# Patient Record
Sex: FEMALE | Race: ASIAN | NOT HISPANIC OR LATINO | Employment: STUDENT | URBAN - METROPOLITAN AREA
[De-identification: names, ages, dates, MRNs, and addresses within clinical notes are randomized per-mention and may not be internally consistent; named-entity substitution may affect disease eponyms.]

---

## 2019-10-30 ENCOUNTER — OFFICE VISIT (OUTPATIENT)
Dept: ORTHOPEDICS | Facility: CLINIC | Age: 24
End: 2019-10-30
Payer: COMMERCIAL

## 2019-10-30 VITALS — WEIGHT: 142 LBS | HEIGHT: 65 IN | BODY MASS INDEX: 23.66 KG/M2

## 2019-10-30 DIAGNOSIS — M25.561 ACUTE PAIN OF RIGHT KNEE: Primary | ICD-10-CM

## 2019-10-30 RX ORDER — MEDROXYPROGESTERONE ACETATE 150 MG/ML
150 INJECTION, SUSPENSION INTRAMUSCULAR
COMMUNITY
Start: 2019-10-04 | End: 2022-12-12

## 2019-10-30 ASSESSMENT — MIFFLIN-ST. JEOR: SCORE: 1399.99

## 2019-10-30 NOTE — LETTER
10/30/2019       RE: Bea Ramirez  [address] [facility] [address]     Dear Colleague,    Thank you for referring your patient, Bea Ramirez, to the [facility] SPORTS AND ORTHOP[facility] WALK [facility] CLINIC at [facility]. Please see a copy of my visit note below.          SPORTS & ORTHOPEDIC WALK-IN VISIT 10/30/2019    Primary Care Physician:      Started 2 weeks ago Monday, playing volleyball. No trauma, just noticed pain. Following day pain with full flexion and full extension. Didn't feel the same walking around. After that played volleyball again and didn't get better, has gotten a little worse. Has been resting a bit. Feels she has to change positions a lot if sitting or laying down for too long. Achy pain, feels it deep posterior when extending, deep anterior when flexing.   History of knee issues when she was younger but went away eventually.     Reason for visit:     What part of your body is injured / painful?  right knee    What caused the injury /pain? Unsure    How long ago did your injury occur or pain begin? several weeks ago    What are your most bothersome symptoms? Pain, weakness     How would you characterize your symptom?  aching    What makes your symptoms better? Rest    What makes your symptoms worse? Other: sitting in one position for too long, full flexion or extension      Have you been previously seen for this problem? No    Medical History:    Any recent changes to your medical history? No    Any new medication prescribed since last visit? No    Have you had surgery on this body part before? No    Social History:    Occupation: student     Handedness:     Exercise: 3-5 days/week    Review of Systems:    Do you have fever, chills, weight loss? No    Do you have any vision problems? No    Do you have any chest pain or edema? No    Do you have any shortness of breath or wheezing?  No    Do you have stomach problems? No    Do you have any  "numbness or focal weakness? Yes, with sitting sometimes in R leg    Do you have diabetes? No    Do you have problems with bleeding or clotting? No    Do you have an rashes or other skin lesions? No           [facility]  Orthopedics  Vel Julio DO  10/30/2019     Name: Bea Ramirez  MRN: [id number]  Age: 24 year old  : 1995  Referring provider: Referred Self     Chief Complaint: Pain of the Right Knee    Date of Injury: 10/14/19    History of Present Illness:   Bea Ramirez is a 24 year old, female with a history of knee injury who presents today for evaluation of right knee pain. She reports that two weeks ago she was playing volleyball and onset pain with full extension and flexion with no inciting injury or trauma. She notes that she has tried to play volleyball since, and notes that her condition had worsened. Has difficulty walking secondary to the pain. Pain is onset with full flexion deep in anterior aspect and with full extension deep in posterior. Her pain is exacerbated with movement. No mechanical symptoms. No further concerns at this time.     Review of Systems:   A 10-point review of systems was obtained and is negative except for as noted in the HPI.     Medications:     Current Outpatient Medications:      medroxyPROGESTERone (DEPO-PROVERA) 150 MG/ML IM injection, Inject 150 mg into the muscle, Disp: , Rfl:     Allergies:  Patient has no known allergies.     Past Medical History:  No past medical history on file.    Past Surgical History:  No past surgical history on file.     Social History:  She denies tobacco use and denies alcohol use.     Family History:  No family history on file.    Physical Examination:  Height 1.651 m (5' 5\"), weight 64.4 kg (142 lb).  General  - normal appearance, in no obvious distress  HEENT  -Pupils equal, round, no conjunctival injection.  No lid lag  CV  - normal popliteal pulse  Pulm  - normal respiratory pattern, non-labored  Musculoskeletal " - right knee  - stance: normal gait without limp, normal squat without pain  - inspection: no swelling or effusion, normal bone and joint alignment, no obvious deformity  - palpation: no joint line tenderness, patella and patellar tendon non-tender.   - ROM: 135 degrees flexion, -5 degrees extension, not painful, normal actively and passively compared to contralateral  - strength: 5/5 in flexion, 5/5 in extension  - special tests:  (-) Lachman  (-) anterior drawer  (-) posterior drawer  (-) pivot shift  (-) Brandi  (-) Thessaly  (-) varus at 0 and 30 degrees flexion  (-) valgus at 0 and 30 degrees flexion  (-) Yoav s compression test  (-) patellar apprehension  Neuro  - no sensory or motor deficit, grossly normal coordination, normal muscle tone  Skin  - no ecchymosis, erythema, warmth, or induration, no obvious rash  Psych  - interactive, appropriate, normal mood and affect    Imaging:   Deferred     Assessment:   24 year old, female with acute right knee pain suffered during a volleyball game two weeks ago. Examination today rather benign without worrisome findings to suggest any major meniscal injury or ligamentous tear. However given historical elements may have had a mild sprain of knee vs. Small meniscal injury.    Plan:   9. Stop playing volleyball to rest  10. Refrain from heavy weight lower body exercises.   11. Recommended Aleve (2 in the morning and 2 at night) to reduce inflammation x 1 week  12. Provided home exercise program for stretching and conditioning of the knee. Meniscal program specific.  13. Follow up in two weeks if symptoms do not improve x 3 weeks.    IVel DO, have reviewed the above note and agree with the scribe's notation as written.    Scribe Disclosure:  IHasmukh, am serving as a scribe to document services personally performed by Vel Julio DO at this visit, based upon the provider's statements to me. All documentation has been reviewed by the aforementioned  provider prior to being entered into the official medical record.       Again, thank you for allowing me to participate in the care of your patient.      Sincerely,    Vel Julio, DO

## 2019-10-30 NOTE — PATIENT INSTRUCTIONS
BARGER FACTS ABOUT MENISCAL TEARS  Click to enlarge A meniscal tear can be caused by a sudden activity that twists or tears a ligament, such as a forced twisting motion of your knee, or a fall or impact during football, basketball, or soccer.    Straightening your knee further than it can normally be straightened (hyperextension) or trouble bending, like when you land from a jump, is also another cause of a meniscal tear.  A loud, painful pop at the time of the injury is very common. It causes an immediate swelling and pain around the knee as if loose or unstable.    WHAT IS MENISCAL TEAR?  A meniscal tear is tear to the meniscal ligament that keeps the knee from bending inward. The meniscal, along with other ligaments, keeps your knee and leg bones in place when you walk or run. When a ligament is injured, it can be stretched, partially torn, or completely torn. Complete tears make the knee joint very loose and unstable.    A ligament injury is also called a sprain.    HOW IS MENISCAL TEAR DIAGNOSED AND TREATED?  A physician will examine the knee, ligaments, and tissue to make an initial assessment. Typically, he or she will recommend a combination of X-rays, CT scans, or MRIs to determine the exact extent of the damage.     You will need to change or stop doing the activities that cause pain until the ligament has healed.      If you have swelling in your joint, your healthcare team may need to remove fluid from your knee with a needle and syringe.    Your care team may wrap an elastic bandage around your knee to keep the swelling from getting worse. You may need to keep your knee in a knee immobilizer and use crutches to protect your knee while you heal.    For complete tears, you and your healthcare team will decide if you should have intense rehabilitation therapy or if you should have surgery followed by rehab. A torn meniscal cannot be sewn back together. The ligament must be surgically reconstructed by taking  ligaments or tendons from another part of your leg and connecting them to the thighbone and shinbone.    If you have a completely torn mensical and it is not repaired with surgery, the effects will be life-long. Your knee may feel loose and feel like it will give way when you are running and making quick turns. Rehabilitation exercises and a special brace will help improve these symptoms. If you can do your normal activities without pain and are willing to give up activities that put extra stress on your knee, you may not need surgery.    You may consider having reconstructive meniscal tear surgery if:    Your knee is unstable and gives out during routine or athletic activity.    You are a  and your knee could be unstable and give out during your sport (for example, basketball, football, or soccer).    You are not willing to give up sports that involve pivoting and cutting, like soccer and basketball.    You want to prevent further injury to your knee. An unstable knee may lead to more injuries and arthritis.    HOW CAN I MANAGE MENISCAL TEAR?  Follow your healthcare team's instructions, including any recommended physical therapy or exercises.  To keep swelling down and help relieve pain for the first few days after the injury:  Put an ice pack, gel pack, or package of frozen vegetables wrapped in a cloth on the injured area every 3 to 4 hours for up to 20 minutes at a time.  Keep your knee up on a pillow when you sit or lie down.  Take nonprescription pain medicine, such as acetaminophen, ibuprofen, or naproxen. Read the label and take as directed. Unless recommended by your healthcare team, you should not take this medicine for more than 10 days.    Knee Exercises (Meniscal tear)    You may do the first 7 exercises right away. You may do the rest of the exercises when the pain in your knee has decreased.    Passive knee extension: Do this exercise if you are unable to extend your knee fully.  While lying on your back, place a rolled-up towel under the heel of your injured leg so the heel is about 6 inches off the ground. Relax your leg muscles and let gravity slowly straighten your knee. Try to hold this position for 2 minutes. Repeat 3 times. You may feel some discomfort while doing this exercise. Do the exercise several times a day.  This exercise can also be done while sitting in a chair with your heel on another chair or stool.    Heel slide: Sit on a firm surface with your legs straight in front of you. Slowly slide the heel of the foot on your injured side toward your buttock by pulling your knee toward your chest as you slide the heel. Return to the starting position. Do 2 sets of 15.  Standing calf stretch: Stand facing a wall with your hands on the wall at about eye level. Keep your injured leg back with your heel on the floor. Keep the other leg forward with the knee bent. Turn your back foot slightly inward (as if you were pigeon-toed). Slowly lean into the wall until you feel a stretch in the back of your calf. Hold the stretch for 15 to 30 seconds. Return to the starting position. Repeat 3 times. Do this exercise several times each day.    Hamstring stretch on wall: Lie on your back with your buttocks close to a doorway. Stretch your uninjured leg straight out in front of you on the floor through the doorway. Raise your injured leg and rest it against the wall next to the door frame. Keep your leg as straight as possible. You should feel a stretch in the back of your thigh. Hold this position for 15 to 30 seconds. Repeat 3 times.  Straight leg raise: Lie on your back with your legs straight out in front of you. Bend the knee on your uninjured side and place the foot flat on the floor. Tighten the thigh muscle on your injured side and lift your leg about 8 inches off the floor. Keep your leg straight and your thigh muscle tight. Slowly lower your leg back down to the floor. Do 2 sets of  15.    Prone hip extension: Lie on your stomach with your legs straight out behind you. Fold your arms under your head and rest your head on your arms. Draw your belly button in towards your spine and tighten your abdominal muscles. Tighten the buttocks and thigh muscles of the leg on your injured side and lift the leg off the floor about 8 inches. Keep your leg straight. Hold for 5 seconds. Then lower your leg and relax. Do 2 sets of 15.  Clam exercise: Lie on your uninjured side with your hips and knees bent and feet together. Slowly raise your top leg toward the ceiling while keeping your heels touching each other. Hold for 2 seconds and lower slowly. Do 2 sets of 15 repetitions.    Wall squat with a ball: Stand with your back, shoulders, and head against a wall. Look straight ahead. Keep your shoulders relaxed and your feet 3 feet (90 centimeters) from the wall and shoulder's width apart. Place a soccer or basketball-sized ball behind your back. Keeping your back against the wall, slowly squat down to a 45-degree angle. Your thighs will not yet be parallel to the floor. Hold this position for 10 seconds and then slowly slide back up the wall. Repeat 10 times. Build up to 2 sets of 15.  Step-up: Stand with the foot of your injured leg on a support 3 to 5 inches (8 to 13 centimeters) high --like a small step or block of wood. Keep your other foot flat on the floor. Shift your weight onto the injured leg on the support. Straighten your injured leg as the other leg comes off the floor. Return to the starting position by bending your injured leg and slowly lowering your uninjured leg back to the floor. Do 2 sets of 15.    Knee stabilization: Wrap a piece of elastic tubing around the ankle of your uninjured leg. Tie a knot in the other end of the tubing and close it in a door at about ankle height.  Stand facing the door on the leg without tubing (your injured leg) and bend your knee slightly, keeping your thigh  muscles tight. Stay in this position while you move the leg with the tubing (the uninjured leg) straight back behind you. Do 2 sets of 15.  Turn 90 degrees so the leg without tubing is closest to the door. Move the leg with tubing away from your body. Do 2 sets of 15.  Turn 90 degrees again so your back is to the door. Move the leg with tubing straight out in front of you. Do 2 sets of 15.  Turn your body 90 degrees again so the leg with tubing is closest to the door. Move the leg with tubing across your body. Do 2 sets of 15.  Hold onto a chair if you need help balancing. This exercise can be made more challenging by standing on a firm pillow or foam mat while you move the leg with tubing.    Resisted terminal knee extension: Make a loop with a piece of elastic tubing by tying a knot in both ends. Close the knot in a door at knee height. Step into the loop with your injured leg so the tubing is around the back of your knee. Lift the other foot off the ground and hold onto a chair for balance, if needed. Bend the knee with tubing about 45 degrees. Slowly straighten your leg, keeping your thigh muscle tight as you do this. Repeat 15 times. Do 2 sets of 15. If you need an easier way to do this, stand on both legs for better support while you do the exercise.  If you have access to a wobble board, do the following exercises:            Developed by [employer].  Published by [employer].  Copyright  2014 [employer] and/or one of its subsidiaries. All rights reserved.

## 2019-10-30 NOTE — PROGRESS NOTES
"[facility]  Orthopedics  Vel Julio, DO  10/30/2019     Name: Bea Ramirez  MRN: [id number]  Age: 24 year old  : 1995  Referring provider: Referred Self     Chief Complaint: Pain of the Right Knee    Date of Injury: 10/14/19    History of Present Illness:   Bea Ramirez is a 24 year old, female with a history of knee injury who presents today for evaluation of right knee pain. She reports that two weeks ago she was playing volleyball and onset pain with full extension and flexion with no inciting injury or trauma. She notes that she has tried to play volleyball since, and notes that her condition had worsened. Has difficulty walking secondary to the pain. Pain is onset with full flexion deep in anterior aspect and with full extension deep in posterior. Her pain is exacerbated with movement. No mechanical symptoms. No further concerns at this time.     Review of Systems:   A 10-point review of systems was obtained and is negative except for as noted in the HPI.     Medications:     Current Outpatient Medications:      medroxyPROGESTERone (DEPO-PROVERA) 150 MG/ML IM injection, Inject 150 mg into the muscle, Disp: , Rfl:     Allergies:  Patient has no known allergies.     Past Medical History:  No past medical history on file.    Past Surgical History:  No past surgical history on file.     Social History:  She denies tobacco use and denies alcohol use.     Family History:  No family history on file.    Physical Examination:  Height 1.651 m (5' 5\"), weight 64.4 kg (142 lb).  General  - normal appearance, in no obvious distress  HEENT  -Pupils equal, round, no conjunctival injection.  No lid lag  CV  - normal popliteal pulse  Pulm  - normal respiratory pattern, non-labored  Musculoskeletal - right knee  - stance: normal gait without limp, normal squat without pain  - inspection: no swelling or effusion, normal bone and joint alignment, no obvious deformity  - palpation: no joint line " tenderness, patella and patellar tendon non-tender.   - ROM: 135 degrees flexion, -5 degrees extension, not painful, normal actively and passively compared to contralateral  - strength: 5/5 in flexion, 5/5 in extension  - special tests:  (-) Lachman  (-) anterior drawer  (-) posterior drawer  (-) pivot shift  (-) Brandi  (-) Thessaly  (-) varus at 0 and 30 degrees flexion  (-) valgus at 0 and 30 degrees flexion  (-) Yoav s compression test  (-) patellar apprehension  Neuro  - no sensory or motor deficit, grossly normal coordination, normal muscle tone  Skin  - no ecchymosis, erythema, warmth, or induration, no obvious rash  Psych  - interactive, appropriate, normal mood and affect    Imaging:   Deferred     Assessment:   24 year old, female with acute right knee pain suffered during a volleyball game two weeks ago. Examination today rather benign without worrisome findings to suggest any major meniscal injury or ligamentous tear. However given historical elements may have had a mild sprain of knee vs. Small meniscal injury.    Plan:   1. Stop playing volleyball to rest  2. Refrain from heavy weight lower body exercises.   3. Recommended Aleve (2 in the morning and 2 at night) to reduce inflammation x 1 week  4. Provided home exercise program for stretching and conditioning of the knee. Meniscal program specific.  5. Follow up in two weeks if symptoms do not improve x 3 weeks.    IVel DO, have reviewed the above note and agree with the scribe's notation as written.    Scribe Disclosure:  IHasmukh, am serving as a scribe to document services personally performed by Vel Julio DO at this visit, based upon the provider's statements to me. All documentation has been reviewed by the aforementioned provider prior to being entered into the official medical record.

## 2019-10-30 NOTE — LETTER
Date:November 4, 2019      Patient was self referred, no letter generated. Do not send.        [facility] Information

## 2019-10-30 NOTE — PROGRESS NOTES
SPORTS & ORTHOPEDIC WALK-IN VISIT 10/30/2019    Primary Care Physician:      Started 2 weeks ago Monday, playing volleyball. No trauma, just noticed pain. Following day pain with full flexion and full extension. Didn't feel the same walking around. After that played volleyball again and didn't get better, has gotten a little worse. Has been resting a bit. Feels she has to change positions a lot if sitting or laying down for too long. Achy pain, feels it deep posterior when extending, deep anterior when flexing.   History of knee issues when she was younger but went away eventually.     Reason for visit:     What part of your body is injured / painful?  right knee    What caused the injury /pain? Unsure    How long ago did your injury occur or pain begin? several weeks ago    What are your most bothersome symptoms? Pain, weakness     How would you characterize your symptom?  aching    What makes your symptoms better? Rest    What makes your symptoms worse? Other: sitting in one position for too long, full flexion or extension      Have you been previously seen for this problem? No    Medical History:    Any recent changes to your medical history? No    Any new medication prescribed since last visit? No    Have you had surgery on this body part before? No    Social History:    Occupation: student     Handedness:     Exercise: 3-5 days/week    Review of Systems:    Do you have fever, chills, weight loss? No    Do you have any vision problems? No    Do you have any chest pain or edema? No    Do you have any shortness of breath or wheezing?  No    Do you have stomach problems? No    Do you have any numbness or focal weakness? Yes, with sitting sometimes in R leg    Do you have diabetes? No    Do you have problems with bleeding or clotting? No    Do you have an rashes or other skin lesions? No

## 2021-03-02 ENCOUNTER — TRANSFERRED RECORDS (OUTPATIENT)
Dept: HEALTH INFORMATION MANAGEMENT | Facility: CLINIC | Age: 26
End: 2021-03-02

## 2021-03-02 LAB — PAP-ABSTRACT: NORMAL

## 2022-06-14 ENCOUNTER — LAB REQUISITION (OUTPATIENT)
Dept: LAB | Facility: CLINIC | Age: 27
End: 2022-06-14

## 2022-06-14 PROCEDURE — 86481 TB AG RESPONSE T-CELL SUSP: CPT | Performed by: INTERNAL MEDICINE

## 2022-06-16 LAB
GAMMA INTERFERON BACKGROUND BLD IA-ACNC: 0.17 IU/ML
M TB IFN-G BLD-IMP: NEGATIVE
M TB IFN-G CD4+ BCKGRND COR BLD-ACNC: 9.83 IU/ML
MITOGEN IGNF BCKGRD COR BLD-ACNC: -0.06 IU/ML
MITOGEN IGNF BCKGRD COR BLD-ACNC: -0.07 IU/ML
QUANTIFERON MITOGEN: 10 IU/ML
QUANTIFERON NIL TUBE: 0.17 IU/ML
QUANTIFERON TB1 TUBE: 0.1 IU/ML
QUANTIFERON TB2 TUBE: 0.11

## 2022-09-01 LAB — CHLAMYDIA - HIM PATIENT REPORTED: NEGATIVE

## 2022-12-12 ENCOUNTER — OFFICE VISIT (OUTPATIENT)
Dept: MIDWIFE SERVICES | Facility: CLINIC | Age: 27
End: 2022-12-12
Payer: COMMERCIAL

## 2022-12-12 VITALS
WEIGHT: 139.5 LBS | DIASTOLIC BLOOD PRESSURE: 70 MMHG | BODY MASS INDEX: 22.42 KG/M2 | SYSTOLIC BLOOD PRESSURE: 100 MMHG | HEIGHT: 66 IN | HEART RATE: 48 BPM

## 2022-12-12 DIAGNOSIS — N89.8 VAGINAL DISCHARGE: Primary | ICD-10-CM

## 2022-12-12 LAB
CLUE CELLS: ABNORMAL
TRICHOMONAS, WET PREP: ABNORMAL
WBC'S/HIGH POWER FIELD, WET PREP: ABNORMAL
YEAST, WET PREP: ABNORMAL

## 2022-12-12 PROCEDURE — 87210 SMEAR WET MOUNT SALINE/INK: CPT | Performed by: MIDWIFE

## 2022-12-12 PROCEDURE — 99203 OFFICE O/P NEW LOW 30 MIN: CPT | Performed by: MIDWIFE

## 2022-12-12 RX ORDER — COPPER 313.4 MG/1
1 INTRAUTERINE DEVICE INTRAUTERINE
COMMUNITY
Start: 2021-03-02 | End: 2031-02-28

## 2022-12-12 RX ORDER — CHLORAL HYDRATE 500 MG
1 CAPSULE ORAL DAILY
COMMUNITY

## 2022-12-12 NOTE — PROGRESS NOTES
"SUBJECTIVE:  Bea Ramirez is an 27 year old woman who presents with vaginal discharge. Symptoms   include discharge described as white. Onset of symptoms 10   days ago, gradually improving since. Recently self treated with OTC yeast meds for 1 day then 7 day treatment, last used Friday 12/9/22.    Predisposing factors: None  Hx of previous vaginitis: rare had yeast and BV in Sept 2022 was treated for both with oral meds  Sexually active: yes, single partner, contraception - Parguard IUD    Current Outpatient Medications   Medication     fish oil-omega-3 fatty acids 1000 MG capsule     paragard intrauterine copper device     No current facility-administered medications for this visit.     No Known Allergies    Social History     Tobacco Use     Smoking status: Never     Smokeless tobacco: Never   Substance Use Topics     Alcohol use: Yes     Comment: rarely       OBJECTIVE:  /70   Pulse (!) 48   Ht 1.664 m (5' 5.5\")   Wt 63.3 kg (139 lb 8 oz)   LMP 11/13/2022   Breastfeeding No   BMI 22.86 kg/m    Pelvic: normal female genitalia, slight redness noted on labia minora, no abnl lesions or abnl discharge noted on exam. Wet prep obtained.    ASSESSMENT:  Normal physiologic discharge    PLAN:  1)  Reviewed vaginal/perineal health and comfort measures prn  2)  Recheck if symptoms persist, worsen, or new symptoms develop.    PE: Discussed vaginitis, modes of transmission, and rationale for   Treatment.  30minutes on the date of the encounter doing chart review, review of outside records, review of test results, patient visit and documentation  "

## 2023-01-29 ENCOUNTER — HEALTH MAINTENANCE LETTER (OUTPATIENT)
Age: 28
End: 2023-01-29

## 2023-03-30 ASSESSMENT — ENCOUNTER SYMPTOMS
HEADACHES: 0
PARESTHESIAS: 1
SORE THROAT: 0
HEARTBURN: 0
HEMATURIA: 0
COUGH: 0
MYALGIAS: 0
CHILLS: 0
DYSURIA: 0
DIZZINESS: 0
NERVOUS/ANXIOUS: 0
BREAST MASS: 0
HEMATOCHEZIA: 0
PALPITATIONS: 0
ARTHRALGIAS: 0
EYE PAIN: 0
ABDOMINAL PAIN: 0
CONSTIPATION: 0
WEAKNESS: 0
JOINT SWELLING: 0
FEVER: 0
DIARRHEA: 0
NAUSEA: 0
FREQUENCY: 0
SHORTNESS OF BREATH: 0

## 2023-03-31 ENCOUNTER — OFFICE VISIT (OUTPATIENT)
Dept: FAMILY MEDICINE | Facility: CLINIC | Age: 28
End: 2023-03-31
Payer: COMMERCIAL

## 2023-03-31 VITALS
HEIGHT: 66 IN | WEIGHT: 142.5 LBS | BODY MASS INDEX: 22.9 KG/M2 | TEMPERATURE: 98.1 F | OXYGEN SATURATION: 99 % | SYSTOLIC BLOOD PRESSURE: 112 MMHG | DIASTOLIC BLOOD PRESSURE: 73 MMHG | HEART RATE: 51 BPM

## 2023-03-31 DIAGNOSIS — Z11.59 NEED FOR HEPATITIS C SCREENING TEST: ICD-10-CM

## 2023-03-31 DIAGNOSIS — N92.0 MENORRHAGIA WITH REGULAR CYCLE: ICD-10-CM

## 2023-03-31 DIAGNOSIS — Z11.4 SCREENING FOR HIV (HUMAN IMMUNODEFICIENCY VIRUS): ICD-10-CM

## 2023-03-31 DIAGNOSIS — Z00.00 ROUTINE GENERAL MEDICAL EXAMINATION AT A HEALTH CARE FACILITY: Primary | ICD-10-CM

## 2023-03-31 LAB
ERYTHROCYTE [DISTWIDTH] IN BLOOD BY AUTOMATED COUNT: 13.1 % (ref 10–15)
HCT VFR BLD AUTO: 41.6 % (ref 35–47)
HGB BLD-MCNC: 13.8 G/DL (ref 11.7–15.7)
MCH RBC QN AUTO: 28.5 PG (ref 26.5–33)
MCHC RBC AUTO-ENTMCNC: 33.2 G/DL (ref 31.5–36.5)
MCV RBC AUTO: 86 FL (ref 78–100)
PLATELET # BLD AUTO: 301 10E3/UL (ref 150–450)
RBC # BLD AUTO: 4.84 10E6/UL (ref 3.8–5.2)
WBC # BLD AUTO: 7.1 10E3/UL (ref 4–11)

## 2023-03-31 PROCEDURE — 91313 COVID-19 VACCINE BIVALENT BOOSTER 18+ (MODERNA): CPT | Performed by: FAMILY MEDICINE

## 2023-03-31 PROCEDURE — 99385 PREV VISIT NEW AGE 18-39: CPT | Mod: 25 | Performed by: FAMILY MEDICINE

## 2023-03-31 PROCEDURE — 87389 HIV-1 AG W/HIV-1&-2 AB AG IA: CPT | Performed by: FAMILY MEDICINE

## 2023-03-31 PROCEDURE — 0134A COVID-19 VACCINE BIVALENT BOOSTER 18+ (MODERNA): CPT | Performed by: FAMILY MEDICINE

## 2023-03-31 PROCEDURE — 86803 HEPATITIS C AB TEST: CPT | Performed by: FAMILY MEDICINE

## 2023-03-31 PROCEDURE — 85027 COMPLETE CBC AUTOMATED: CPT | Performed by: FAMILY MEDICINE

## 2023-03-31 PROCEDURE — 36415 COLL VENOUS BLD VENIPUNCTURE: CPT | Performed by: FAMILY MEDICINE

## 2023-03-31 PROCEDURE — 99212 OFFICE O/P EST SF 10 MIN: CPT | Mod: 25 | Performed by: FAMILY MEDICINE

## 2023-03-31 ASSESSMENT — ENCOUNTER SYMPTOMS
PARESTHESIAS: 0
NAUSEA: 0
SORE THROAT: 0
COUGH: 0
CHILLS: 0
DIARRHEA: 0
HEARTBURN: 0
EYE PAIN: 0
HEADACHES: 0
HEMATURIA: 0
FEVER: 0
JOINT SWELLING: 0
NERVOUS/ANXIOUS: 0
SHORTNESS OF BREATH: 0
HEMATOCHEZIA: 0
ABDOMINAL PAIN: 0
MYALGIAS: 0
BREAST MASS: 0
WEAKNESS: 0
DIZZINESS: 0
PALPITATIONS: 0
FREQUENCY: 0
ARTHRALGIAS: 0
CONSTIPATION: 0
DYSURIA: 0

## 2023-03-31 ASSESSMENT — PAIN SCALES - GENERAL: PAINLEVEL: NO PAIN (0)

## 2023-03-31 NOTE — RESULT ENCOUNTER NOTE
Dear Lainey,     Here are your recent results which are within the expected range. Please continue with your current plan of care and let us know if you have any questions or concerns.    Regards,  Muna Veliz, DO

## 2023-03-31 NOTE — PATIENT INSTRUCTIONS
You can try over the counter DICLOFENAC GEL for the breast tenderness during your menses.     For the constipation take prune juice, kiwi. The other thing you can do metamucil.     Do an anti histamine as needed for the skin sensitivity.     Preventive Health Recommendations  Female Ages 26 - 39  Yearly exam:   See your health care provider every year in order to  Review health changes.   Discuss preventive care.    Review your medicines if you your doctor has prescribed any.    Until age 30: Get a Pap test every three years (more often if you have had an abnormal result).    After age 30: Talk to your doctor about whether you should have a Pap test every 3 years or have a Pap test with HPV screening every 5 years.   You do not need a Pap test if your uterus was removed (hysterectomy) and you have not had cancer.  You should be tested each year for STDs (sexually transmitted diseases), if you're at risk.   Talk to your provider about how often to have your cholesterol checked.  If you are at risk for diabetes, you should have a diabetes test (fasting glucose).  Shots: Get a flu shot each year. Get a tetanus shot every 10 years.   Nutrition:   Eat at least 5 servings of fruits and vegetables each day.  Eat whole-grain bread, whole-wheat pasta and brown rice instead of white grains and rice.  Get adequate Calcium and Vitamin D.     Lifestyle  Exercise at least 150 minutes a week (30 minutes a day, 5 days of the week). This will help you control your weight and prevent disease.  Limit alcohol to one drink per day.  No smoking.   Wear sunscreen to prevent skin cancer.  See your dentist every six months for an exam and cleaning.

## 2023-03-31 NOTE — PROGRESS NOTES
SUBJECTIVE:   CC: Lainey is an 28 year old who presents for preventive health visit.   Patient has been advised of split billing requirements and indicates understanding: Yes  Healthy Habits:     Getting at least 3 servings of Calcium per day:  Yes    Bi-annual eye exam:  Yes    Dental care twice a year:  Yes    Sleep apnea or symptoms of sleep apnea:  None    Diet:  Regular (no restrictions)    Frequency of exercise:  4-5 days/week    Duration of exercise:  Greater than 60 minutes    Taking medications regularly:  Yes    Medication side effects:  None    PHQ-2 Total Score: 0    Additional concerns today:  Yes    Skin Reactivity  -Occurs in the cold, also in the summer.   -Patient will have irritable skin with contact.   -Started several years ago  -Patient does have eczema  -It is more contact related, the itching is first and the rash  -Does not occur with sun exposure    Bloating  -Last several months. Patient feels very full after eating.   -Patient reports it sometimes gets better with passing gas or defecation.   -Patient can experience it with more lactose rich foods.   -Lately majority of stools are bristol 2.   -No bright red blood per rectum  -Patient denies other symptoms with that. No correlation to time of the month.  -No chest pain, nausea, vomiting feelings.   -Patient does not have fiber supplements.      Today's PHQ-2 Score:       3/30/2023     4:56 PM   PHQ-2 ( 1999 Pf[employer])   Q1: Little interest or pleasure in doing things 0   Q2: Feeling down, depressed or hopeless 0   PHQ-2 Score 0   Q1: Little interest or pleasure in doing things Not at all    Not at all   Q2: Feeling down, depressed or hopeless Not at all    Not at all   PHQ-2 Score 0    0     Social History     Tobacco Use     Smoking status: Never     Smokeless tobacco: Never   Vaping Use     Vaping status: Not on file   Substance Use Topics     Alcohol use: Yes     Comment: rarely         3/30/2023     4:56 PM   Alcohol Use   Prescreen:  >3 drinks/day or >7 drinks/week? No     Reviewed orders with patient.  Reviewed health maintenance and updated orders accordingly - Yes  Lab work is in process    Breast Cancer Screening:        3/30/2023     4:57 PM   Breast CA Risk Assessment (FHS-7)   Do you have a family history of breast, colon, or ovarian cancer? No / Unknown     Patient under 40 years of age: Routine Mammogram Screening not recommended.   Pertinent mammograms are reviewed under the imaging tab.    History of abnormal Pap smear: NO - age 21-29 PAP every 3 years recommended     Reviewed and updated as needed this visit by clinical staff   Tobacco  Allergies  Meds  Problems  Med Hx  Surg Hx  Fam Hx          Reviewed and updated as needed this visit by Provider   Tobacco  Allergies  Meds  Problems  Med Hx  Surg Hx  Fam Hx         History reviewed. No pertinent past medical history.   Past Surgical History:   Procedure Laterality Date     C UNLISTED PROCEDURE, ABDOMEN/PERITONEUM/OMENTUM      Description: Hernia Repair;  Recorded: 04/01/2010;  Comments: Bilateral inguinal hernia repair       Review of Systems   Constitutional: Negative for chills and fever.   HENT: Negative for congestion, ear pain, hearing loss and sore throat.    Eyes: Negative for pain and visual disturbance.   Respiratory: Negative for cough and shortness of breath.    Cardiovascular: Negative for chest pain, palpitations and peripheral edema.   Gastrointestinal: Negative for abdominal pain, constipation, diarrhea, heartburn, hematochezia and nausea.   Breasts:  Positive for tenderness. Negative for breast mass and discharge.   Genitourinary: Negative for dysuria, frequency, genital sores, hematuria, pelvic pain, urgency, vaginal bleeding and vaginal discharge.   Musculoskeletal: Negative for arthralgias, joint swelling and myalgias.   Skin: Negative for rash.   Neurological: Negative for dizziness, weakness, headaches and paresthesias.   Psychiatric/Behavioral:  "Negative for mood changes. The patient is not nervous/anxious.      OBJECTIVE:   /73 (BP Location: Right arm, Patient Position: Sitting, Cuff Size: Adult Regular)   Pulse 51   Temp 98.1  F (36.7  C) (Oral)   Ht 1.664 m (5' 5.5\")   Wt 64.6 kg (142 lb 8 oz)   LMP 03/12/2023   SpO2 99%   BMI 23.35 kg/m    Physical Exam  GENERAL: healthy, alert and no distress  NECK: no adenopathy, no asymmetry, masses, or scars and thyroid normal to palpation  RESP: lungs clear to auscultation - no rales, rhonchi or wheezes  CV: regular rate and rhythm, normal S1 S2, no S3 or S4, no murmur, click or rub, no peripheral edema and peripheral pulses strong  ABDOMEN: soft, nontender, no hepatosplenomegaly, no masses and bowel sounds normal  MS: no gross musculoskeletal defects noted, no edema  SKIN: no suspicious lesions or rashes. No residual raised lesions after scratch test with wooden tongue depressor.  PSYCH: mentation appears normal, affect normal/bright    Diagnostic Test Results:  Labs reviewed in Epic  Results for orders placed or performed in visit on 03/31/23   HIV Antigen Antibody Combo     Status: Normal   Result Value Ref Range    HIV Antigen Antibody Combo Nonreactive Nonreactive   Hepatitis C Screen Reflex to HCV RNA Quant and Genotype     Status: Normal   Result Value Ref Range    Hepatitis C Antibody Nonreactive Nonreactive    Narrative    Assay performance characteristics have not been established for newborns, infants, and children.   CBC with platelets     Status: Normal   Result Value Ref Range    WBC Count 7.1 4.0 - 11.0 10e3/uL    RBC Count 4.84 3.80 - 5.20 10e6/uL    Hemoglobin 13.8 11.7 - 15.7 g/dL    Hematocrit 41.6 35.0 - 47.0 %    MCV 86 78 - 100 fL    MCH 28.5 26.5 - 33.0 pg    MCHC 33.2 31.5 - 36.5 g/dL    RDW 13.1 10.0 - 15.0 %    Platelet Count 301 150 - 450 10e3/uL       ASSESSMENT/PLAN:   Bea was seen today for physical, derm problem and bloating.    Diagnoses and all orders for this " visit:    Routine general medical examination at a health care facility  Patient is otherwise healthy here for physical. Recommendations as below. Will defer pap smear due to menses.   -     REVIEW OF HEALTH MAINTENANCE PROTOCOL ORDERS  -     REVIEW OF HEALTH MAINTENANCE PROTOCOL ORDERS  -     COVID-19 VACCINE BIVALENT BOOSTER 18+ (MODERNA)  -     PRIMARY CARE FOLLOW-UP SCHEDULING; Future    Menorrhagia with regular cycle  Chronic, increased heavy and painful bleeding. Discussed that it's likely related to Paragard IUD. Will do CBC.   -     REVIEW OF HEALTH MAINTENANCE PROTOCOL ORDERS  -     CBC with platelets; Future  -     CBC with platelets    Screening for HIV (human immunodeficiency virus)  -     HIV Antigen Antibody Combo; Future  -     HIV Antigen Antibody Combo    Need for hepatitis C screening test  -     Hepatitis C Screen Reflex to HCV RNA Quant and Genotype; Future  -     Hepatitis C Screen Reflex to HCV RNA Quant and Genotype    Patient has been advised of split billing requirements and indicates understanding: Yes      COUNSELING:  Reviewed preventive health counseling, as reflected in patient instructions        She reports that she has never smoked. She has never used smokeless tobacco.      Muna Veliz DO  [facility]

## 2023-04-03 LAB
HCV AB SERPL QL IA: NONREACTIVE
HIV 1+2 AB+HIV1 P24 AG SERPL QL IA: NONREACTIVE

## 2024-01-04 ENCOUNTER — TRANSFERRED RECORDS (OUTPATIENT)
Dept: HEALTH INFORMATION MANAGEMENT | Facility: CLINIC | Age: 29
End: 2024-01-04
Payer: COMMERCIAL

## 2024-02-27 ENCOUNTER — MYC MEDICAL ADVICE (OUTPATIENT)
Dept: FAMILY MEDICINE | Facility: CLINIC | Age: 29
End: 2024-02-27
Payer: COMMERCIAL

## 2024-05-02 SDOH — HEALTH STABILITY: PHYSICAL HEALTH: ON AVERAGE, HOW MANY DAYS PER WEEK DO YOU ENGAGE IN MODERATE TO STRENUOUS EXERCISE (LIKE A BRISK WALK)?: 4 DAYS

## 2024-05-02 SDOH — HEALTH STABILITY: PHYSICAL HEALTH: ON AVERAGE, HOW MANY MINUTES DO YOU ENGAGE IN EXERCISE AT THIS LEVEL?: 60 MIN

## 2024-05-02 ASSESSMENT — SOCIAL DETERMINANTS OF HEALTH (SDOH): HOW OFTEN DO YOU GET TOGETHER WITH FRIENDS OR RELATIVES?: TWICE A WEEK

## 2024-05-03 ENCOUNTER — OFFICE VISIT (OUTPATIENT)
Dept: FAMILY MEDICINE | Facility: CLINIC | Age: 29
End: 2024-05-03
Attending: FAMILY MEDICINE
Payer: COMMERCIAL

## 2024-05-03 VITALS
HEIGHT: 65 IN | RESPIRATION RATE: 16 BRPM | DIASTOLIC BLOOD PRESSURE: 74 MMHG | BODY MASS INDEX: 23.49 KG/M2 | OXYGEN SATURATION: 98 % | TEMPERATURE: 98.1 F | WEIGHT: 141 LBS | SYSTOLIC BLOOD PRESSURE: 110 MMHG | HEART RATE: 53 BPM

## 2024-05-03 DIAGNOSIS — Z00.00 ROUTINE GENERAL MEDICAL EXAMINATION AT A HEALTH CARE FACILITY: Primary | ICD-10-CM

## 2024-05-03 DIAGNOSIS — L65.9 HAIR LOSS: ICD-10-CM

## 2024-05-03 DIAGNOSIS — Z12.4 CERVICAL CANCER SCREENING: ICD-10-CM

## 2024-05-03 LAB
ERYTHROCYTE [DISTWIDTH] IN BLOOD BY AUTOMATED COUNT: 13.3 % (ref 10–15)
HCT VFR BLD AUTO: 39.3 % (ref 35–47)
HGB BLD-MCNC: 13 G/DL (ref 11.7–15.7)
MCH RBC QN AUTO: 29.2 PG (ref 26.5–33)
MCHC RBC AUTO-ENTMCNC: 33.1 G/DL (ref 31.5–36.5)
MCV RBC AUTO: 88 FL (ref 78–100)
PLATELET # BLD AUTO: 264 10E3/UL (ref 150–450)
RBC # BLD AUTO: 4.45 10E6/UL (ref 3.8–5.2)
WBC # BLD AUTO: 6.3 10E3/UL (ref 4–11)

## 2024-05-03 PROCEDURE — 36415 COLL VENOUS BLD VENIPUNCTURE: CPT | Performed by: FAMILY MEDICINE

## 2024-05-03 PROCEDURE — 82728 ASSAY OF FERRITIN: CPT | Performed by: FAMILY MEDICINE

## 2024-05-03 PROCEDURE — G0145 SCR C/V CYTO,THINLAYER,RESCR: HCPCS | Performed by: FAMILY MEDICINE

## 2024-05-03 PROCEDURE — 84443 ASSAY THYROID STIM HORMONE: CPT | Performed by: FAMILY MEDICINE

## 2024-05-03 PROCEDURE — 85027 COMPLETE CBC AUTOMATED: CPT | Performed by: FAMILY MEDICINE

## 2024-05-03 PROCEDURE — 83540 ASSAY OF IRON: CPT | Performed by: FAMILY MEDICINE

## 2024-05-03 PROCEDURE — 83550 IRON BINDING TEST: CPT | Performed by: FAMILY MEDICINE

## 2024-05-03 PROCEDURE — 99213 OFFICE O/P EST LOW 20 MIN: CPT | Mod: 25 | Performed by: FAMILY MEDICINE

## 2024-05-03 PROCEDURE — 99395 PREV VISIT EST AGE 18-39: CPT | Mod: 25 | Performed by: FAMILY MEDICINE

## 2024-05-03 NOTE — PROGRESS NOTES
Preventive Care Visit  [facility]  JUNIOR CROSS-DO ANJANA, Family Medicine  May 3, 2024      Assessment & Plan     Routine general medical examination at a health care facility  Patient is a well appearing female. No changes in family history.   - PRIMARY CARE FOLLOW-UP SCHEDULING  - Iron and iron binding capacity  - Ferritin  - CBC with platelets    Cervical cancer screening  - Pap Screen reflex to HPV if ASCUS - recommend age 25 - 29    Hair loss  This recent year. Discussed routine hair care recommendations and commonly used hair growth products. Given heavier periods with paragard, reasonable to check an iron level. Will also add TSH. Given patient has this, cold induced urticaria and left middle finger longitudinal ridge, recommend dermatology referral.   - TSH with free T4 reflex  - Adult Dermatology  Referral      Patient has been advised of split billing requirements and indicates understanding: Yes  Ordering of each unique test    Counseling  Appropriate preventive services were discussed with this patient, including applicable screening as appropriate for fall prevention, nutrition, physical activity, Tobacco-use cessation, weight loss and cognition.  Checklist reviewing preventive services available has been given to the patient.  Reviewed patient's diet, addressing concerns and/or questions.   She is at risk for psychosocial distress and has been provided with information to reduce risk.       See Patient Instructions    Mariia Retana is a 29 year old, presenting for the following:  Physical        5/3/2024     3:20 PM   Additional Questions   Roomed by Natasha Hooker   Accompanied by N/A        Health Care Directive  Patient does not have a Health Care Directive or Living Will: Discussed advance care planning with patient; information given to patient to review.    HPI    Hair  -Patient feels she is losing a lot of hair.   -She has no other health changes. It is  mostly excessive shedding. She denies strand breakage.  -Has been observing awhile.         5/2/2024   General Health   How would you rate your overall physical health? Good   Feel stress (tense, anxious, or unable to sleep) Only a little   (!) STRESS CONCERN      5/2/2024   Nutrition   Three or more servings of calcium each day? Yes   Diet: Regular (no restrictions)   How many servings of fruit and vegetables per day? 4 or more   How many sweetened beverages each day? 0-1         5/2/2024   Exercise   Days per week of moderate/strenous exercise 4 days   Average minutes spent exercising at this level 60 min         5/2/2024   Social Factors   Frequency of gathering with friends or relatives Twice a week   Worry food won't last until get money to buy more No   Food not last or not have enough money for food? No   Do you have housing?  Yes   Are you worried about losing your housing? No   Lack of transportation? No   Unable to get utilities (heat,electricity)? No         5/2/2024   Dental   Dentist two times every year? Yes         5/2/2024   TB Screening   Were you born outside of the [place]? No         Today's PHQ-2 Score:       5/2/2024     5:10 PM   PHQ-2 ( 1999 Pfizer)   Q1: Little interest or pleasure in doing things 0   Q2: Feeling down, depressed or hopeless 0   PHQ-2 Score 0   Q1: Little interest or pleasure in doing things Not at all   Q2: Feeling down, depressed or hopeless Not at all   PHQ-2 Score 0           5/2/2024   Substance Use   Alcohol more than 3/day or more than 7/wk No   Do you use any other substances recreationally? No     Social History     Tobacco Use    Smoking status: Never    Smokeless tobacco: Never   Substance Use Topics    Alcohol use: Yes     Comment: Rarely    Drug use: Never             5/2/2024   Breast Cancer Screening   Family history of breast, colon, or ovarian cancer? No / Unknown      Mammogram Screening - Patient under 40 years of age: Routine Mammogram Screening not  "recommended.         5/2/2024   STI Screening   New sexual partner(s) since last STI/HIV test? No     History of abnormal Pap smear: NO - age 21-29 PAP every 3 years recommended        3/2/2021    10:23 AM   PAP / HPV   PAP-ABSTRACT See Scanned Document           This result is from an external source.           5/2/2024   Contraception/Family Planning   Questions about contraception or family planning No        Reviewed and updated as needed this visit by Provider   Tobacco  Allergies  Meds  Problems  Med Hx  Surg Hx  Fam Hx            History reviewed. No pertinent past medical history.  Past Surgical History:   Procedure Laterality Date    C UNLISTED PROCEDURE, ABDOMEN/PERITONEUM/OMENTUM      Description: Hernia Repair;  Recorded: 04/01/2010;  Comments: Bilateral inguinal hernia repair         Review of Systems  Constitutional, HEENT, cardiovascular, pulmonary, gi and gu systems are negative, except as otherwise noted.     Objective    Exam  /74 (BP Location: Right arm, Patient Position: Sitting, Cuff Size: Adult Regular)   Pulse 53   Temp 98.1  F (36.7  C) (Oral)   Resp 16   Ht 1.66 m (5' 5.35\")   Wt 64 kg (141 lb)   LMP 04/11/2024   SpO2 98%   BMI 23.21 kg/m     Estimated body mass index is 23.21 kg/m  as calculated from the following:    Height as of this encounter: 1.66 m (5' 5.35\").    Weight as of this encounter: 64 kg (141 lb).    Physical Exam  Genitourinary:     Vagina: Normal.      Cervix: Normal.      Comments: Right sided blue papular irregular shaped lesion on the right inner labia    Two white papules at the 3 and 9 o clock position    IUD strings visualized      GENERAL: alert and no distress  EYES: Eyes grossly normal to inspection, PERRL and conjunctivae and sclerae normal  HENT: ear canals and TM's normal, nose and mouth without ulcers or lesions  NECK: no adenopathy, no asymmetry, masses, or scars  RESP: lungs clear to auscultation - no rales, rhonchi or wheezes  CV: " regular rate and rhythm, normal S1 S2, no S3 or S4, no murmur, click or rub, no peripheral edema  ABDOMEN: soft, nontender, no hepatosplenomegaly, no masses and bowel sounds normal  MS: no gross musculoskeletal defects noted, no edema  SKIN: no suspicious lesions or rashes  NEURO: Normal strength and tone, mentation intact and speech normal  PSYCH: mentation appears normal, affect normal/bright        Signed Electronically by: JUNIOR FLORES, DO

## 2024-05-04 LAB
FERRITIN SERPL-MCNC: 11 NG/ML (ref 6–175)
IRON BINDING CAPACITY (ROCHE): 299 UG/DL (ref 240–430)
IRON SATN MFR SERPL: 16 % (ref 15–46)
IRON SERPL-MCNC: 49 UG/DL (ref 37–145)
TSH SERPL DL<=0.005 MIU/L-ACNC: 1.83 UIU/ML (ref 0.3–4.2)

## 2024-05-08 ENCOUNTER — MYC MEDICAL ADVICE (OUTPATIENT)
Dept: FAMILY MEDICINE | Facility: CLINIC | Age: 29
End: 2024-05-08
Payer: COMMERCIAL

## 2024-05-08 DIAGNOSIS — L65.9 HAIR LOSS: Primary | ICD-10-CM

## 2024-05-08 LAB
BKR LAB AP GYN ADEQUACY: NORMAL
BKR LAB AP GYN INTERPRETATION: NORMAL
BKR LAB AP HPV REFLEX: NORMAL
BKR LAB AP LMP: NORMAL
BKR LAB AP PREVIOUS ABNORMAL: NORMAL
PATH REPORT.COMMENTS IMP SPEC: NORMAL
PATH REPORT.COMMENTS IMP SPEC: NORMAL
PATH REPORT.RELEVANT HX SPEC: NORMAL

## 2024-06-27 NOTE — PROGRESS NOTES
"Virtual Visit Details    Type of service:  Video Visit     Originating Location (pt. Location): Other work setting     Distant Location (provider location):  On-site  Platform used for Video Visit: [facility]     OUTPATIENT NUTRITION ASSESSMENT   REASON FOR ASSESSMENT  Bea Olivas James referred by Dr. Moore for MNT related to Hair loss [L65.9]  - Primary .    Patient accompanied by self       ASSESSMENT   Nutrition History:  - Information obtained from patient.   - Patient is on a regular diet at home. Patient concerned about hair loss and wanting to know if diet impacting hair loss. Patient prepares her own meals. Patient does not consume dairy.     Protein sources: nuts, eggs, chicken, fish and tofu   Skips dairy -no milk, cheese     Diet Recall:  Breakfast: skips or fruit- banana or orange    Lunch: lemon grass chicken wrap + 6 clementines (12 PM)   Dinner: rice, tilapia cherries; spring roll or noodle salad; soup with shrimp and spinach   Snack: popcorn -pre-popped 1-2 cups; cashew yogurt with granola   Beverages: water; oat milk on occasion      NUTRITION FOCUSED PHYSICAL ASSESSMENT (NFPA) FOR DIAGNOSING MALNUTRITION  Yes         Observed:   No nutrition-related physical findings observed    Obtained from Chart/Interdisciplinary Team:  None noted     LABS  Labs reviewed    MEDICATIONS/SUPPLEMENTS  Medications/supplements reviewed-fish oil and 1000 international unit(s) vitamin D     ANTHROPOMETRICS   Height: 5'5.35\"  Weight: 141 lbs  BMI (kg/m2): 23.2 kg/m2   Weight Status:  Normal BMI  %IBW: 111%  Weight History:   Wt Readings from Last 10 Encounters:   05/03/24 64 kg (141 lb)   03/31/23 64.6 kg (142 lb 8 oz)   12/12/22 63.3 kg (139 lb 8 oz)   10/30/19 64.4 kg (142 lb)   06/13/13 60.2 kg (132 lb 11.2 oz) (68%, Z= 0.47)*   07/11/11 57.2 kg (126 lb 1.7 oz) (66%, Z= 0.42)*   04/01/10 54.5 kg (120 lb 4 oz) (68%, Z= 0.45)*   07/07/09 51.8 kg (114 lb 3.2 oz) (67%, Z= 0.43)*   10/22/08 50.3 kg (111 lb) (71%, " Z= 0.56)*   09/21/08 50.3 kg (111 lb) (72%, Z= 0.60)*     * Growth percentiles are based on CDC (Girls, 2-20 Years) data.        ASSESSED NUTRITION NEEDS  Estimated Energy Needs: 4912-3843 kcals/day (20-25 Kcal/Kg)  Justification:  (maintenance)  Estimated Protein Needs: 64-77 grams protein/day (1-1.2 g pro/Kg)  Justification:  (maintenance)  Estimated Fluid Needs: 9294-4174 mL/day (30-35 mL/kg)    ASSESSED MALNUTRITION STATUS  % Weight Loss:  None noted  % Intake:  No decreased intake noted  Subcutaneous Fat Loss:  None observed  Loss of Muscle Mass:  None observed  Fluid Retention:  None noted    Malnutrition Diagnosis:  Patient does not meet two of the above criteria necessary for diagnosing malnutrition    DIAGNOSIS   Nutrition Diagnosis:  Food and nutrition-related knowledge deficit related to lack of prior exposure as evidenced by no previous need for food and nutrition related recommendations       INTERVENTIONS   Nutrition Prescription   Recommend adequate protein for hair loss     IMPLEMENTATION   Assessed learning needs and learning preference  Teaching Method(s) used: Booklet / Handout  Explanation  Vitamin and Mineral Supplements: recommend complete multivitamin and mineral; 1000 mg calcium per day   Diet Education:  Provided education on nutrients   Nutrition Education (Content):   a)  Discussed current diet intake. Reviewed food behaviors. Discussed protein, calcium and iron sources. Suggest patient aim for 65-75 grams protein per day. Recommend patient add vitamin C source at meals to improve iron absorption. Also suggest taking vitamin D and fish oil supplements at meals for better absorption. Supported patient with the challenge of balanced diet.     b)  Provided Academy of Nutrition and Dietetics High Calcium Foods List; Academy of Nutrition and Dietetics Iron Content of Foods   Nutrition Education (Application):   a)  Discussed current eating plans and offered suggestions for iron sources     b)   Patient verbalizes understanding of diet by stating will add calcium into diet   Expected patient engagement: good     GOALS  Aim for 65-75 grams protein per day  Include 1000 mg calcium per day     FOLLOW UP/MONITORING   Progress towards goals will be monitored and evaluated per protocol and Practice Guidelines  Patient to call for follow up appointment or if has further questions  RD name and number provided     Time Spent with Patient  32 minutes     May Kunz, RD, LD  [facility] Outpatient Dietitian  [phone] (office phone)

## 2024-06-28 ENCOUNTER — HOSPITAL ENCOUNTER (OUTPATIENT)
Dept: NUTRITION | Facility: CLINIC | Age: 29
Discharge: HOME | End: 2024-06-28
Attending: FAMILY MEDICINE | Admitting: FAMILY MEDICINE
Payer: COMMERCIAL

## 2024-06-28 DIAGNOSIS — L65.9 HAIR LOSS: ICD-10-CM

## 2024-06-28 PROCEDURE — 97802 MEDICAL NUTRITION INDIV IN: CPT | Mod: GT,95 | Performed by: DIETITIAN, REGISTERED

## 2024-06-28 NOTE — DISCHARGE INSTRUCTIONS
Robbie Retana,     It was nice meeting you today. Here are some of our talking points:    Aim for 65-75 grams protein per day  Include 1000 mg calcium per day  Take vitamin D and fish oil at meal  Choose vitamin C source when consuming iron source     Please let me know if you have any questions.    Take montse,    May Ibarra, RD, LD  [facility] Outpatient Dietitian  [phone] (office phone)

## 2024-11-22 ENCOUNTER — MYC MEDICAL ADVICE (OUTPATIENT)
Dept: FAMILY MEDICINE | Facility: CLINIC | Age: 29
End: 2024-11-22
Payer: COMMERCIAL

## 2024-11-22 DIAGNOSIS — K60.2 ANAL FISSURE: Primary | ICD-10-CM

## 2024-11-27 RX ORDER — NITROGLYCERIN 4 MG/G
1 OINTMENT RECTAL EVERY 12 HOURS
Qty: 30 G | Refills: 0 | Status: SHIPPED | OUTPATIENT
Start: 2024-11-27

## 2025-01-28 ENCOUNTER — OFFICE VISIT (OUTPATIENT)
Dept: DERMATOLOGY | Facility: CLINIC | Age: 30
End: 2025-01-28
Payer: COMMERCIAL

## 2025-01-28 DIAGNOSIS — L65.9 HAIR LOSS: ICD-10-CM

## 2025-01-28 DIAGNOSIS — L64.9 ANDROGENETIC ALOPECIA: Primary | ICD-10-CM

## 2025-01-28 PROCEDURE — 84270 ASSAY OF SEX HORMONE GLOBUL: CPT | Performed by: PHYSICIAN ASSISTANT

## 2025-01-28 PROCEDURE — 99203 OFFICE O/P NEW LOW 30 MIN: CPT | Performed by: PHYSICIAN ASSISTANT

## 2025-01-28 PROCEDURE — 82607 VITAMIN B-12: CPT | Performed by: PHYSICIAN ASSISTANT

## 2025-01-28 PROCEDURE — 84155 ASSAY OF PROTEIN SERUM: CPT | Performed by: PHYSICIAN ASSISTANT

## 2025-01-28 PROCEDURE — 86038 ANTINUCLEAR ANTIBODIES: CPT | Performed by: PHYSICIAN ASSISTANT

## 2025-01-28 PROCEDURE — 82306 VITAMIN D 25 HYDROXY: CPT | Performed by: PHYSICIAN ASSISTANT

## 2025-01-28 PROCEDURE — 36415 COLL VENOUS BLD VENIPUNCTURE: CPT | Performed by: PHYSICIAN ASSISTANT

## 2025-01-28 PROCEDURE — 82627 DEHYDROEPIANDROSTERONE: CPT | Performed by: PHYSICIAN ASSISTANT

## 2025-01-28 NOTE — LETTER
1/28/2025      Bea Ramirez  [address] [place] [address]      Dear Colleague,    Thank you for referring your patient, Bea Ramirez, to the [facility]. Please see a copy of my visit note below.    HPI:   Chief complaints: Bea Ramirez is a pleasant 29 year old female who presents for evaluation of hair loss. She has noticed some thinning and loss of density over the past couple of years. She also feels like she has been shedding more. No pain on the scalp; she does have occasional itching. No fhx of hair thinning. She has not tried any OTC treatments yet.       PHYSICAL EXAM:    There were no vitals taken for this visit.  Skin exam performed as follows: Type 2 skin. Mood appropriate  Alert and Oriented X 3. Well developed, well nourished in no distress.  General appearance: Normal  Head including face: Normal  Eyes: conjunctiva and lids: Normal  Mouth: Lips, teeth, gums: Normal  Neck: Normal  Skin: Scalp and body hair: See below    Decreased density through vertex scalp. Scalp normal without erythema or scaling. Negative hair pull.       ASSESSMENT/PLAN:     Mild Androgenic alopecia. Advised on natural course and progression secondary to hormones and genetics. Discussed topical minoxidil 5% foam or solution as well as finasteride.    --Discussed topical minoxidil, spironolactone, Viviscal, Nutrafol - she will think about these  --Check labs today  --Photos taken for reference          Follow-up: 6-9 months  CC:   Scribed By: Christen Calix, MS, PA-C      Again, thank you for allowing me to participate in the care of your patient.        Sincerely,        Christen Calix PA-C    Electronically signed

## 2025-01-28 NOTE — PROGRESS NOTES
HPI:   Chief complaints: Bea Ramirez is a pleasant 29 year old female who presents for evaluation of hair loss. She has noticed some thinning and loss of density over the past couple of years. She also feels like she has been shedding more. No pain on the scalp; she does have occasional itching. No fhx of hair thinning. She has not tried any OTC treatments yet.       PHYSICAL EXAM:    There were no vitals taken for this visit.  Skin exam performed as follows: Type 2 skin. Mood appropriate  Alert and Oriented X 3. Well developed, well nourished in no distress.  General appearance: Normal  Head including face: Normal  Eyes: conjunctiva and lids: Normal  Mouth: Lips, teeth, gums: Normal  Neck: Normal  Skin: Scalp and body hair: See below    Decreased density through vertex scalp. Scalp normal without erythema or scaling. Negative hair pull.       ASSESSMENT/PLAN:     Mild Androgenic alopecia. Advised on natural course and progression secondary to hormones and genetics. Discussed topical minoxidil 5% foam or solution as well as finasteride.    --Discussed topical minoxidil, spironolactone, Viviscal, Nutrafol - she will think about these  --Check labs today  --Photos taken for reference          Follow-up: 6-9 months  CC:   Scribed By: Christen Calix, MS, PA-C

## 2025-01-28 NOTE — PATIENT INSTRUCTIONS
This is very mild androgenetic alopecia (female pattern hair loss)    Treatment options:  OTC 5% minoxidil foam or solution   Oral spironolactone (prescription from me)  Viviscal + saw palmetto OR Nutrafol     We will check blood work today

## 2025-01-29 LAB
ANA SER QL IF: NEGATIVE
DHEA-S SERPL-MCNC: 149 UG/DL (ref 35–430)
PROT SERPL-MCNC: 7.1 G/DL (ref 6.4–8.3)
SHBG SERPL-SCNC: 84 NMOL/L (ref 30–135)
VIT B12 SERPL-MCNC: 431 PG/ML (ref 232–1245)
VIT D+METAB SERPL-MCNC: 26 NG/ML (ref 20–50)

## 2025-04-03 ENCOUNTER — PATIENT OUTREACH (OUTPATIENT)
Dept: CARE COORDINATION | Facility: CLINIC | Age: 30
End: 2025-04-03
Payer: COMMERCIAL

## 2025-04-17 ENCOUNTER — PATIENT OUTREACH (OUTPATIENT)
Dept: CARE COORDINATION | Facility: CLINIC | Age: 30
End: 2025-04-17
Payer: COMMERCIAL

## 2025-06-07 ENCOUNTER — HEALTH MAINTENANCE LETTER (OUTPATIENT)
Age: 30
End: 2025-06-07